# Patient Record
Sex: MALE
[De-identification: names, ages, dates, MRNs, and addresses within clinical notes are randomized per-mention and may not be internally consistent; named-entity substitution may affect disease eponyms.]

---

## 2023-02-12 ENCOUNTER — NURSE TRIAGE (OUTPATIENT)
Dept: OTHER | Facility: CLINIC | Age: 33
End: 2023-02-12

## 2023-02-12 NOTE — TELEPHONE ENCOUNTER
Location of patient: [place]    Subjective: Caller states \"Seen for a dog bite a couple days ago in the ER. \"     \"Now has clear liquid coming out of the cut. I was told to come back if it was getting infected. \"    Wound is the middle finger - knuckle  Puncture wound    No stitches    Rcvd tetanus shot, Norco, Amoxicillin. Current Symptoms: clear fluid leaking from wound    Associated Symptoms: NA    Pain Severity: /NA    Temperature: Denies     What has been tried: Amoxicillin    Recommended disposition: Home Care - clean with soap and water, keep clean. Care advice provided, patient verbalizes understanding; denies any other questions or concerns. Outcome:  home care and keep clean, watch for infection      This triage is a result of a call to the [address] [place] [address]    Reason for Disposition   Wound doesn't sound infected    Protocols used:  Wound Infection-ADULT-